# Patient Record
Sex: FEMALE | Race: BLACK OR AFRICAN AMERICAN | NOT HISPANIC OR LATINO | Employment: FULL TIME | URBAN - METROPOLITAN AREA
[De-identification: names, ages, dates, MRNs, and addresses within clinical notes are randomized per-mention and may not be internally consistent; named-entity substitution may affect disease eponyms.]

---

## 2018-11-16 PROBLEM — K21.9 GASTROESOPHAGEAL REFLUX DISEASE WITHOUT ESOPHAGITIS: Status: ACTIVE | Noted: 2018-11-16

## 2018-11-16 PROBLEM — I10 ESSENTIAL HYPERTENSION, BENIGN: Status: ACTIVE | Noted: 2018-11-16

## 2019-02-15 PROBLEM — J32.0 CHRONIC MAXILLARY SINUSITIS: Status: ACTIVE | Noted: 2019-02-15

## 2019-02-15 PROBLEM — F51.04 CHRONIC INSOMNIA: Status: ACTIVE | Noted: 2019-02-15

## 2019-05-13 ENCOUNTER — OFFICE VISIT (OUTPATIENT)
Dept: OTOLARYNGOLOGY | Facility: CLINIC | Age: 59
End: 2019-05-13
Payer: COMMERCIAL

## 2019-05-13 VITALS
SYSTOLIC BLOOD PRESSURE: 123 MMHG | WEIGHT: 172.81 LBS | DIASTOLIC BLOOD PRESSURE: 74 MMHG | BODY MASS INDEX: 27.9 KG/M2 | HEART RATE: 75 BPM | TEMPERATURE: 98 F

## 2019-05-13 DIAGNOSIS — R51.9 SINUS HEADACHE: Primary | ICD-10-CM

## 2019-05-13 PROCEDURE — 99999 PR PBB SHADOW E&M-NEW PATIENT-LVL III: ICD-10-PCS | Mod: PBBFAC,,, | Performed by: PHYSICIAN ASSISTANT

## 2019-05-13 PROCEDURE — 99999 PR PBB SHADOW E&M-NEW PATIENT-LVL III: CPT | Mod: PBBFAC,,, | Performed by: PHYSICIAN ASSISTANT

## 2019-05-13 PROCEDURE — 99203 OFFICE O/P NEW LOW 30 MIN: CPT | Mod: S$GLB,,, | Performed by: PHYSICIAN ASSISTANT

## 2019-05-13 PROCEDURE — 99203 PR OFFICE/OUTPT VISIT, NEW, LEVL III, 30-44 MIN: ICD-10-PCS | Mod: S$GLB,,, | Performed by: PHYSICIAN ASSISTANT

## 2019-05-13 NOTE — PROGRESS NOTES
Subjective:       Patient ID: Maria M Jacques is a 59 y.o. female.    Chief Complaint: Sinus Problem (pressure to front of head causes pain)    Ms. Jacques is a very pleasant 60yo female here to see me today with complaints of frontal head pressure/pain with laughing, sneezing, and coughing.  She states pressure only lasts for a couple of seconds. She has been treated for sinusitis in 5/3/19 and then again last week ( however she has not started 2nd course of antibiotics).  She denies any runny nose, cough, congestion, fever.      Review of Systems   Constitutional: Negative for chills, fatigue, fever and unexpected weight change.   HENT: Negative for congestion, dental problem, ear discharge, ear pain, facial swelling, hearing loss, nosebleeds, postnasal drip, rhinorrhea, sinus pressure, sneezing, sore throat, tinnitus, trouble swallowing and voice change.    Eyes: Negative for redness, itching and visual disturbance.   Respiratory: Negative for cough, choking, shortness of breath and wheezing.    Cardiovascular: Negative for chest pain and palpitations.   Gastrointestinal: Negative for abdominal pain.        No reflux.   Musculoskeletal: Negative for gait problem.   Skin: Negative for rash.   Neurological: Positive for headaches. Negative for dizziness and light-headedness.       Objective:      Physical Exam   Constitutional: She is oriented to person, place, and time. She appears well-developed and well-nourished. No distress.   HENT:   Head: Normocephalic and atraumatic.   Right Ear: Tympanic membrane, external ear and ear canal normal.   Left Ear: Tympanic membrane, external ear and ear canal normal.   Nose: Nose normal. No mucosal edema, rhinorrhea, nasal deformity or septal deviation. No epistaxis. Right sinus exhibits no maxillary sinus tenderness and no frontal sinus tenderness. Left sinus exhibits no maxillary sinus tenderness and no frontal sinus tenderness.   Mouth/Throat: Uvula is midline, oropharynx is  clear and moist and mucous membranes are normal. Mucous membranes are not pale and not dry. No dental caries. No oropharyngeal exudate or posterior oropharyngeal erythema.   Eyes: Pupils are equal, round, and reactive to light. Conjunctivae, EOM and lids are normal. Right eye exhibits no chemosis. Left eye exhibits no chemosis. Right conjunctiva is not injected. Left conjunctiva is not injected. No scleral icterus. Right eye exhibits normal extraocular motion and no nystagmus. Left eye exhibits normal extraocular motion and no nystagmus.   Neck: Trachea normal and phonation normal. No tracheal tenderness present. No tracheal deviation present. No thyroid mass and no thyromegaly present.   Cardiovascular: Intact distal pulses.   Pulmonary/Chest: Effort normal. No stridor. No respiratory distress.   Abdominal: She exhibits no distension.   Lymphadenopathy:        Head (right side): No submental, no submandibular, no preauricular, no posterior auricular and no occipital adenopathy present.        Head (left side): No submental, no submandibular, no preauricular, no posterior auricular and no occipital adenopathy present.     She has no cervical adenopathy.   Neurological: She is alert and oriented to person, place, and time. No cranial nerve deficit.   Skin: Skin is warm and dry. No rash noted. No erythema.   Psychiatric: She has a normal mood and affect. Her behavior is normal.       Assessment:       1. Sinus headache        Plan:       HA: I have order a CT of her sinus to rule out sinus disease. If it is negative, I would recommend neurology evaluation. RTC as needed.

## 2019-05-13 NOTE — LETTER
May 14, 2019      Diana Chandra MD  [address] [facility] [id number] [place] [address] Otorhinolaryngology  [address] [place] [id number]  Phone: [phone]  Fax: [phone]          Patient: Maria M Jacques   MR Number: [id number]   YOB: 1960   Date of Visit: 5/13/2019       Dear Dr. Diana Chandra:    Thank you for referring Maria M Jacques to me for evaluation. Attached you will find relevant portions of my assessment and plan of care.    If you have questions, please do not hesitate to call me. I look forward to following Maria M Jacques along with you.    Sincerely,    SAMANTA Roa  CC:  No Recipients    If you would like to receive this communication electronically, please contact [email or web address] or [phone] to request more information on [employer] Link access.    For providers and/or their staff who would like to refer a patient to [facility], please contact us through our one-stop-shop provider referral line, [facility], at [phone].    If you feel you have received this communication in error or would no longer like to receive these types of communications, please e-mail [email or web address]

## 2019-05-16 ENCOUNTER — TELEPHONE (OUTPATIENT)
Dept: OTOLARYNGOLOGY | Facility: CLINIC | Age: 59
End: 2019-05-16

## 2019-05-16 DIAGNOSIS — R51.9 SINUS HEADACHE: ICD-10-CM

## 2019-05-16 DIAGNOSIS — J32.9 CHRONIC SINUSITIS, UNSPECIFIED LOCATION: Primary | ICD-10-CM

## 2019-05-16 NOTE — TELEPHONE ENCOUNTER
"New CT Sinus order placed specifying "external" as requested        ----- Message from Valentine Mike V sent at 5/16/2019  3:06 PM CDT -----  Contact: pt  Eduar Agrawal,  Can you please place another CT order, the one we have is no good. It needs to say 'External' please.     Thanks!  YC      ----- Message -----  From: Seda Norman  Sent: 5/16/2019  11:54 AM  To: [facility] Staff    Pt request a call back needs orders for CT scan in net work for insurance .. .[phone] (home)         "

## 2019-05-22 ENCOUNTER — TELEPHONE (OUTPATIENT)
Dept: OTOLARYNGOLOGY | Facility: CLINIC | Age: 59
End: 2019-05-22

## 2019-05-22 NOTE — TELEPHONE ENCOUNTER
Faxed Auth Approval to [facility]. @ #[phone], this is the second tie it has been faxed.

## 2019-05-22 NOTE — TELEPHONE ENCOUNTER
Spoke with patient regarding her pre auth for [facility]. I have reached out to Pre service to give me an update, once I hear from them I will call the patient to make her aware.    YC      ----- Message from Azalea Paulino sent at 5/22/2019  8:43 AM CDT -----  Contact: self/[phone]  Would like to consult with nurse regarding the appt for patient CT Scan, please call back at [phone]. Thanks/ar

## 2020-11-16 PROBLEM — R94.31 NONSPECIFIC ABNORMAL ELECTROCARDIOGRAM (ECG) (EKG): Status: ACTIVE | Noted: 2020-11-16

## 2022-01-12 PROBLEM — F32.5 MAJOR DEPRESSIVE DISORDER WITH SINGLE EPISODE, IN FULL REMISSION: Status: ACTIVE | Noted: 2022-01-12

## 2022-01-12 PROBLEM — R94.31 ABNORMAL ELECTROCARDIOGRAM: Status: ACTIVE | Noted: 2020-11-16

## 2022-10-10 ENCOUNTER — PATIENT MESSAGE (OUTPATIENT)
Dept: RESEARCH | Facility: HOSPITAL | Age: 62
End: 2022-10-10
Payer: COMMERCIAL